# Patient Record
Sex: FEMALE | Race: WHITE | URBAN - METROPOLITAN AREA
[De-identification: names, ages, dates, MRNs, and addresses within clinical notes are randomized per-mention and may not be internally consistent; named-entity substitution may affect disease eponyms.]

---

## 2020-06-29 ENCOUNTER — TRANSFERRED RECORDS (OUTPATIENT)
Dept: HEALTH INFORMATION MANAGEMENT | Facility: CLINIC | Age: 35
End: 2020-06-29

## 2020-07-01 ENCOUNTER — TRANSCRIBE ORDERS (OUTPATIENT)
Dept: OTHER | Age: 35
End: 2020-07-01

## 2020-07-01 ENCOUNTER — MEDICAL CORRESPONDENCE (OUTPATIENT)
Dept: HEALTH INFORMATION MANAGEMENT | Facility: CLINIC | Age: 35
End: 2020-07-01

## 2020-07-01 DIAGNOSIS — H04.552 NASOLACRIMAL DUCT OBSTRUCTION, ACQUIRED, LEFT: Primary | ICD-10-CM

## 2020-07-02 ENCOUNTER — TELEPHONE (OUTPATIENT)
Dept: OPHTHALMOLOGY | Facility: CLINIC | Age: 35
End: 2020-07-02

## 2020-07-06 NOTE — TELEPHONE ENCOUNTER
FUTURE VISIT INFORMATION      FUTURE VISIT INFORMATION:    Date: 7/21/20    Time: 9:30am    [place] [facility] [place]:    Referring provider:  Lisandra Gupta  Referring providers clinic:  [facility]     Reason for visit/diagnosis  Chronic Nasolacrimal duct obstruction - would like repeat DCR,left eye/scope irrigation    RECORDS REQUESTED FROM:       Clinic name Comments Records Status Imaging Status   [facility]    recs scanned into chart EPIC

## 2020-08-21 ENCOUNTER — PRE VISIT (OUTPATIENT)
Dept: OPHTHALMOLOGY | Facility: CLINIC | Age: 35
End: 2020-08-21

## 2020-08-21 ENCOUNTER — OFFICE VISIT (OUTPATIENT)
Dept: OPHTHALMOLOGY | Facility: CLINIC | Age: 35
End: 2020-08-21
Payer: COMMERCIAL

## 2020-08-21 ENCOUNTER — TELEPHONE (OUTPATIENT)
Dept: OPHTHALMOLOGY | Facility: CLINIC | Age: 35
End: 2020-08-21

## 2020-08-21 DIAGNOSIS — Z11.59 ENCOUNTER FOR SCREENING FOR OTHER VIRAL DISEASES: Primary | ICD-10-CM

## 2020-08-21 DIAGNOSIS — H04.202 LEFT EPIPHORA: ICD-10-CM

## 2020-08-21 DIAGNOSIS — H04.202 LEFT EPIPHORA: Primary | ICD-10-CM

## 2020-08-21 RX ORDER — ONDANSETRON 4 MG/1
4 TABLET, FILM COATED ORAL
COMMUNITY
Start: 2018-11-28

## 2020-08-21 RX ORDER — BUTORPHANOL TARTRATE 10 MG/ML
SPRAY NASAL
COMMUNITY
Start: 2019-02-12

## 2020-08-21 RX ORDER — KETOROLAC TROMETHAMINE 30 MG/ML
INJECTION, SOLUTION INTRAMUSCULAR; INTRAVENOUS
COMMUNITY
Start: 2019-05-23

## 2020-08-21 ASSESSMENT — VISUAL ACUITY
OS_SC: 20/20
OD_SC+: -1
METHOD: SNELLEN - LINEAR
OS_SC+: -
OD_SC: 20/20

## 2020-08-21 ASSESSMENT — SLIT LAMP EXAM - LIDS: COMMENTS: NORMAL

## 2020-08-21 ASSESSMENT — CONF VISUAL FIELD
METHOD: COUNTING FINGERS
OS_NORMAL: 1
OD_NORMAL: 1

## 2020-08-21 ASSESSMENT — TONOMETRY
IOP_METHOD: ICARE
OD_IOP_MMHG: 11
OS_IOP_MMHG: 12

## 2020-08-21 NOTE — LETTER
2020         RE:  :  MRN: Cathryn Mahajan  1985  [id number]     Dear Dr. Lisandra Gupta,    Thank you for asking me to see your patient, Cathryn Mahajan, for an oculoplastic   consultation.  My assessment and plan are below.  For further details, please see my attached clinic note.      Assessment & Plan     Cathryn Mahajan is a 34 year old female with the following diagnoses:   1. Left epiphora - Left Eye          S/p Dacryocystorhinostomy (DCR) with continuous tearing. Likely multi-factorial.     PLAN:  Left conjunctivodacryocystorhinostomy with Mansfield tube           Again, thank you for allowing me to participate in the care of your patient.      Sincerely,    Carlos Mcgraw MD  Department of Ophthalmology and Visual Neurosciences  [facility]    CC: Lisandra Gupta  [facility]  [address]. \Hospital Has a New Name and Outlook.\"" [id number]  VIA Outside Provider Messaging

## 2020-08-21 NOTE — TELEPHONE ENCOUNTER
Met with patient to schedule surgery with Dr. Carlos Mcgraw.    Surgery was scheduled on 09/09 at [facility]  Patient will have H&P at [facility]     Patient is aware a COVID-19 test is needed before their procedure. The test should be with-in 4 days of their procedure.   Test Details: Patient will call [facility] to schedule test     Post-Op visit was scheduled on 09/18  Patient is aware a / is needed day of surgery.   Surgery packet was given, patient has my direct contact information for any further questions.

## 2020-08-21 NOTE — PROGRESS NOTES
Chief Complaints and History of Present Illnesses   Patient presents with     Consult For     Chief Complaint(s) and History of Present Illness(es)     Consult For     In left eye.  Onset was gradual.  This started years ago.  Charactertized   as  blurred vision.  Occurring constantly.  It is worse throughout the   day.  Since onset it is gradually worsening.  Associated symptoms include   tearing.  Treatments tried include artificial tears.  Pain was noted as   0/10.              Comments     Referred by [facility] for NLDO eval OS. Has had surgery with stint in the   past but has since fallen out. Tearing for 3-4 years since last surgery.   Eye is irritated but not painful, causes blurred vision. Using OTC drops    Evelin Von COT 9:27 AM August 21, 2020       Kerri Enriquez COT 9:08 AM August 21, 2020     History left external DCR w/ silicone intubation approximately 9 years ago   after facial trauma.  Stents stayed in for several months, but has had   tearing ever since the surgery.  Works as  and works around   many irritants that cause tearing.  Tears run down left cheek everyday.    Right side no issues.         Assessment & Plan     Cathryn Mahajan is a 34 year old female with the following diagnoses:   1. Left epiphora - Left Eye    2. Left epiphora       S/p Dacryocystorhinostomy (DCR) with continuous tearing. Likely multi-factorial.     PLAN:  Left conjunctivodacryocystorhinostomy with Mansfield tube             Attending Physician Attestation:  Complete documentation of historical and exam elements from today's encounter can be found in the full encounter summary report (not reduplicated in this progress note).  I personally obtained the chief complaint(s) and history of present illness.  I confirmed and edited as necessary the review of systems, past medical/surgical history, family history, social history, and examination findings as documented by others; and I examined the patient myself.  I  personally reviewed the relevant tests, images, and reports as documented above.  I formulated and edited as necessary the assessment and plan and discussed the findings and management plan with the patient and family. I personally reviewed the ophthalmic test(s) associated with this encounter, agree with the interpretation(s) as documented by the resident/fellow, and have edited the corresponding report(s) as necessary.   -Carlos Mcgraw MD  10:18 AM 8/21/2020    Today with Cathryn Mahajan, I reviewed the indications, risks, benefits, and alternatives of the proposed surgical procedure including, but not limited to, failure obtain the desired result  and need for additional surgery, bleeding, infection, loss of vision, loss of the eye, and the remote possibility of permanent damage to any organ system or death with the use of anesthesia.  I provided multiple opportunities for the questions, answered all questions to the best of my ability, and confirmed that my answers and my discussion were understood.     - Carlos Mcgraw MD 10:19 AM 8/21/2020

## 2020-08-21 NOTE — NURSING NOTE
Chief Complaints and History of Present Illnesses   Patient presents with     Consult For     Chief Complaint(s) and History of Present Illness(es)     Consult For     Laterality: left eye    Onset: gradual    Onset: years ago    Quality: blurred vision    Frequency: constantly    Timing: throughout the day    Course: gradually worsening    Associated symptoms: tearing    Treatments tried: artificial tears    Pain scale: 0/10              Comments     Referred by [facility] for NLDO eval OS. Has had surgery with stint in the past but has since fallen out. Tearing for 3-4 years since last surgery. Eye is irritated but not painful, causes blurred vision. Using OTC drops    Evelin Longoria COT 9:27 AM August 21, 2020       Kerri Enriquez COT 9:08 AM August 21, 2020

## 2020-08-21 NOTE — PROGRESS NOTES
Chief Complaints and History of Present Illnesses   Patient presents with     Consult For     Chief Complaint(s) and History of Present Illness(es)     Consult For     In left eye.  Onset was gradual.  This started years ago.  Charactertized   as  blurred vision.  Occurring constantly.  It is worse throughout the   day.  Since onset it is gradually worsening.  Associated symptoms include   tearing.  Treatments tried include artificial tears.  Pain was noted as   0/10.              Comments     Referred by [facility] for NLDO eval OS. Has had surgery with stint in the   past but has since fallen out. Tearing for 3-4 years since last surgery.   Eye is irritated but not painful, causes blurred vision. Using OTC drops    Evelin Longoria COT 9:27 AM August 21, 2020       Kerri Enriquez COT 9:08 AM August 21, 2020     History left external DCR w/ silicone intubation approximately 9 years ago   after facial trauma.  Stents stayed in for several months, but has had   tearing ever since the surgery.  Works as  and works around   many irritants that cause tearing.  Tears run down left cheek everyday.    Right side no issues.                     Assessment & Plan     Cathryn Mahajan is a 34 year old female with the following diagnoses:   1. Left epiphora - Left Eye       Patient being seen at request of or referred by Dr. Gupta.  History of left external DCR ~9 years ago with persistent epiphora left eye since surgery.  Occurs daily, no help from tears/OTC allergy drops.  Puncta patent and easily open to irrigation with slightly enlarged lower punctum.  Nasal mucosal ostium is patent and unobstructed.      Plan:  Consider punctoplasty vs Mansfield tube vs Botox to lacrimal gland.                Jarocho Velásquez MD, MPH  Oculoplastics Fellow

## 2020-08-21 NOTE — PATIENT INSTRUCTIONS
TEAR SYSTEM  The tear film on the surface of the eye is a critical component of maintaining vision. Tears nourish and lubricate the surface of the eye as well as wash away debris. A smooth, balanced tear film (consisting of water, oil and mucus) also allows light to enter the eye in an optimal fashion. If there is a disturbance of the tear film, patients will often experience tearing, burning, irritation and most importantly blurred vision. Patients who experience tearing either have a problem with tear production or tear drainage.    Increased Tear Production and Dry Eyes  The eye has two sets of structures that produce tears. Smaller tear glands help maintain a baseline level of moisture on the surface of the eye. Unfortunately, inflammatory conditions like rheumatoid arthritis, Sjogrens disease as well as aging and menopause lead to decreased tear production. As tear production diminishes, the surface of the eye starts to dry out. Further, inflammation of the oil glands along the edge of the eyelid, common in patients with roseacea, also causes early breakdown and evaporation of the tear film. The brain senses the eye is both dry and irritated and in turn signals the main tear gland to flush the eye. As a result, the dry eye paradoxically tears and becomes watery. Patients with dry eyes note intermittent tearing of the eyes during activities like reading, driving, watching TV, using a computer or going outside on a windy day. These all cause the eye to dry out because the eye blinks less during these activities. The treatment for dry eyes includes 1) replacing tears with artificial lubricants which can be bought over the counter, 2) medications like Restasis that decrease inflammation in tear glands and encourages natural tear production to resume and finally 3) plugging of the tear drain. Other causes of increased tear production exist like allergies, infections and eyelashes poking the eye. These conditions can  often be found during examination.       What Are the Causes of Obstructed Tear Ducts?  An obstruction of the tear ducts may occur due to numerous reasons (aging, trauma, inflammatory conditions, medications and tumors) and cause numerous signs and symptoms ranging from wateriness or tearing to discharge, swelling, pain and infection. These signs and symptoms may result from the tear drainage system becoming obstructed at any point from the puncta to the nasal cavity.       What Are the Symptoms of Obstructed Tear Ducts?  If the tear passageways become blocked, tears cannot drain properly and may overflow from the eyelids onto the face as if you were crying. In addition to excessive tearing you may also experience blurred vision, mucous discharge, eye irritation, and painful swelling in the inner corner of the eyelids. A thorough examination by an ophthalmic plastic surgeon can determine the cause of tearing and recommended treatment.     How is an Obstructed Tear Duct Treated or Repaired?   Depending on your symptoms and their severity, your specialist will suggest an appropriate course. In mild cases, a treatment of warm compresses and antibiotics may be recommended. In more severe cases, surgical intervention to bypass the tear duct obstruction (dacryocystorhinostomy or DCR surgery) may be recommended. A DCR is performed by creating a new tear passageway from the lacrimal sac to the nose, bypassing the obstruction. This procedure may be perfomed through a small incision between the eye and nose (external DCR) or through the nose using a special lighted telescope (endoscopic DCR). A small silicone tube called a stent may temporarily be placed in the new passageway to keep it open during the healing process. In a small percentage of cases, the obstruction is between the puncta and the lacrimal sac. In these cases, in addition to the DCR procedure, the surgeon will insert a tiny artificial tear drain called a Mansfield  Tube. A Mansfield Tube is made of Pyrex glass and allows tears to drain directly from the eye to the lacrimal sac.       Where Is The Surgery Performed?  DCR surgery is usually performed as an outpatient procedure. Patients usually have some bruising and swelling on the side of the nose that subsides in one to two weeks. In general, surgery has a greater than 90% success rate and most patients experience a resolution of their tearing and discharge problems once surgery and recovery are completed.       Who Should Perform DCR Surgery?  When choosing a surgeon to perform a dacryocystorhinostomy or DCR, look for an ophthalmic plastic reconstructive and cosmetic surgeon who specializes in the eyelids, orbit, and tear drain system. It s also important that he or she is a member of the American Society of Ophthalmic Plastic and Reconstructive Surgery (ASOPRS). Dr. Dawson's membership in the American Society of Ophthalmic Plastic and Reconstructive Surgery (ASOPRS) indicates he is not only a board certified ophthalmologist who knows the anatomy and structure of the eyelids and orbit, but also has had extensive training in ophthalmic plastic reconstructive and cosmetic surgery.

## 2020-09-03 ENCOUNTER — ANESTHESIA EVENT (OUTPATIENT)
Dept: SURGERY | Facility: AMBULATORY SURGERY CENTER | Age: 35
End: 2020-09-03

## 2020-09-04 ENCOUNTER — ANESTHESIA (OUTPATIENT)
Dept: SURGERY | Facility: AMBULATORY SURGERY CENTER | Age: 35
End: 2020-09-04

## 2020-09-04 ENCOUNTER — HOSPITAL ENCOUNTER (OUTPATIENT)
Facility: AMBULATORY SURGERY CENTER | Age: 35
End: 2020-09-04
Attending: OPHTHALMOLOGY
Payer: COMMERCIAL

## 2020-09-04 VITALS
DIASTOLIC BLOOD PRESSURE: 92 MMHG | WEIGHT: 201 LBS | HEART RATE: 70 BPM | OXYGEN SATURATION: 97 % | HEIGHT: 68 IN | SYSTOLIC BLOOD PRESSURE: 142 MMHG | BODY MASS INDEX: 30.46 KG/M2 | TEMPERATURE: 97.2 F | RESPIRATION RATE: 20 BRPM

## 2020-09-04 DIAGNOSIS — R11.0 NAUSEA: Primary | ICD-10-CM

## 2020-09-04 DIAGNOSIS — H04.202 LEFT EPIPHORA: ICD-10-CM

## 2020-09-04 DEVICE — IMPLANTABLE DEVICE: Type: IMPLANTABLE DEVICE | Site: EYE | Status: FUNCTIONAL

## 2020-09-04 RX ORDER — ACETAMINOPHEN 325 MG/1
975 TABLET ORAL ONCE
Status: COMPLETED | OUTPATIENT
Start: 2020-09-04 | End: 2020-09-04

## 2020-09-04 RX ORDER — COCAINE HYDROCHLORIDE 40 MG/ML
SOLUTION NASAL PRN
Status: DISCONTINUED | OUTPATIENT
Start: 2020-09-04 | End: 2020-09-04 | Stop reason: HOSPADM

## 2020-09-04 RX ORDER — DEXAMETHASONE SODIUM PHOSPHATE 4 MG/ML
INJECTION, SOLUTION INTRA-ARTICULAR; INTRALESIONAL; INTRAMUSCULAR; INTRAVENOUS; SOFT TISSUE PRN
Status: DISCONTINUED | OUTPATIENT
Start: 2020-09-04 | End: 2020-09-04

## 2020-09-04 RX ORDER — ONDANSETRON 2 MG/ML
INJECTION INTRAMUSCULAR; INTRAVENOUS PRN
Status: DISCONTINUED | OUTPATIENT
Start: 2020-09-04 | End: 2020-09-04

## 2020-09-04 RX ORDER — FENTANYL CITRATE 50 UG/ML
25-50 INJECTION, SOLUTION INTRAMUSCULAR; INTRAVENOUS
Status: DISCONTINUED | OUTPATIENT
Start: 2020-09-04 | End: 2020-09-05 | Stop reason: HOSPADM

## 2020-09-04 RX ORDER — PROPOFOL 10 MG/ML
INJECTION, EMULSION INTRAVENOUS CONTINUOUS PRN
Status: DISCONTINUED | OUTPATIENT
Start: 2020-09-04 | End: 2020-09-04

## 2020-09-04 RX ORDER — MEPERIDINE HYDROCHLORIDE 25 MG/ML
12.5 INJECTION INTRAMUSCULAR; INTRAVENOUS; SUBCUTANEOUS
Status: DISCONTINUED | OUTPATIENT
Start: 2020-09-04 | End: 2020-09-05 | Stop reason: HOSPADM

## 2020-09-04 RX ORDER — ONDANSETRON 4 MG/1
4 TABLET, ORALLY DISINTEGRATING ORAL EVERY 30 MIN PRN
Status: DISCONTINUED | OUTPATIENT
Start: 2020-09-04 | End: 2020-09-05 | Stop reason: HOSPADM

## 2020-09-04 RX ORDER — NEOMYCIN POLYMYXIN B SULFATES AND DEXAMETHASONE 3.5; 10000; 1 MG/ML; [USP'U]/ML; MG/ML
1 SUSPENSION/ DROPS OPHTHALMIC 4 TIMES DAILY
Qty: 5 ML | Refills: 0 | Status: SHIPPED | OUTPATIENT
Start: 2020-09-04

## 2020-09-04 RX ORDER — ONDANSETRON 4 MG/1
4 TABLET, FILM COATED ORAL EVERY 8 HOURS PRN
Qty: 10 TABLET | Refills: 0 | Status: SHIPPED | OUTPATIENT
Start: 2020-09-04

## 2020-09-04 RX ORDER — OXYCODONE HCL 5 MG/5 ML
5 SOLUTION, ORAL ORAL EVERY 4 HOURS PRN
Status: DISCONTINUED | OUTPATIENT
Start: 2020-09-04 | End: 2020-09-05 | Stop reason: HOSPADM

## 2020-09-04 RX ORDER — LIDOCAINE HYDROCHLORIDE AND EPINEPHRINE 10; 10 MG/ML; UG/ML
INJECTION, SOLUTION INFILTRATION; PERINEURAL PRN
Status: DISCONTINUED | OUTPATIENT
Start: 2020-09-04 | End: 2020-09-04 | Stop reason: HOSPADM

## 2020-09-04 RX ORDER — SODIUM CHLORIDE, SODIUM LACTATE, POTASSIUM CHLORIDE, CALCIUM CHLORIDE 600; 310; 30; 20 MG/100ML; MG/100ML; MG/100ML; MG/100ML
INJECTION, SOLUTION INTRAVENOUS CONTINUOUS PRN
Status: DISCONTINUED | OUTPATIENT
Start: 2020-09-04 | End: 2020-09-04

## 2020-09-04 RX ORDER — ERYTHROMYCIN 5 MG/G
OINTMENT OPHTHALMIC PRN
Status: DISCONTINUED | OUTPATIENT
Start: 2020-09-04 | End: 2020-09-04 | Stop reason: HOSPADM

## 2020-09-04 RX ORDER — LIDOCAINE 40 MG/G
CREAM TOPICAL
Status: DISCONTINUED | OUTPATIENT
Start: 2020-09-04 | End: 2020-09-04 | Stop reason: HOSPADM

## 2020-09-04 RX ORDER — HYDROCODONE BITARTRATE AND ACETAMINOPHEN 5; 325 MG/1; MG/1
1 TABLET ORAL EVERY 6 HOURS PRN
Qty: 10 TABLET | Refills: 0 | Status: SHIPPED | OUTPATIENT
Start: 2020-09-04 | End: 2020-09-09

## 2020-09-04 RX ORDER — NALOXONE HYDROCHLORIDE 0.4 MG/ML
.1-.4 INJECTION, SOLUTION INTRAMUSCULAR; INTRAVENOUS; SUBCUTANEOUS
Status: DISCONTINUED | OUTPATIENT
Start: 2020-09-04 | End: 2020-09-05 | Stop reason: HOSPADM

## 2020-09-04 RX ORDER — PROPOFOL 10 MG/ML
INJECTION, EMULSION INTRAVENOUS PRN
Status: DISCONTINUED | OUTPATIENT
Start: 2020-09-04 | End: 2020-09-04

## 2020-09-04 RX ORDER — FENTANYL CITRATE 50 UG/ML
INJECTION, SOLUTION INTRAMUSCULAR; INTRAVENOUS PRN
Status: DISCONTINUED | OUTPATIENT
Start: 2020-09-04 | End: 2020-09-04

## 2020-09-04 RX ORDER — AZITHROMYCIN 250 MG/1
TABLET, FILM COATED ORAL
Qty: 6 TABLET | Refills: 0 | Status: SHIPPED | OUTPATIENT
Start: 2020-09-04 | End: 2020-09-09

## 2020-09-04 RX ORDER — LIDOCAINE HYDROCHLORIDE 20 MG/ML
INJECTION, SOLUTION INFILTRATION; PERINEURAL PRN
Status: DISCONTINUED | OUTPATIENT
Start: 2020-09-04 | End: 2020-09-04

## 2020-09-04 RX ORDER — SODIUM CHLORIDE, SODIUM LACTATE, POTASSIUM CHLORIDE, CALCIUM CHLORIDE 600; 310; 30; 20 MG/100ML; MG/100ML; MG/100ML; MG/100ML
INJECTION, SOLUTION INTRAVENOUS CONTINUOUS
Status: DISCONTINUED | OUTPATIENT
Start: 2020-09-04 | End: 2020-09-04 | Stop reason: HOSPADM

## 2020-09-04 RX ORDER — ONDANSETRON 2 MG/ML
4 INJECTION INTRAMUSCULAR; INTRAVENOUS EVERY 30 MIN PRN
Status: DISCONTINUED | OUTPATIENT
Start: 2020-09-04 | End: 2020-09-05 | Stop reason: HOSPADM

## 2020-09-04 RX ADMIN — ONDANSETRON 4 MG: 2 INJECTION INTRAMUSCULAR; INTRAVENOUS at 12:54

## 2020-09-04 RX ADMIN — DEXAMETHASONE SODIUM PHOSPHATE 4 MG: 4 INJECTION, SOLUTION INTRA-ARTICULAR; INTRALESIONAL; INTRAMUSCULAR; INTRAVENOUS; SOFT TISSUE at 12:03

## 2020-09-04 RX ADMIN — Medication 5 MG: at 13:07

## 2020-09-04 RX ADMIN — SODIUM CHLORIDE, SODIUM LACTATE, POTASSIUM CHLORIDE, CALCIUM CHLORIDE: 600; 310; 30; 20 INJECTION, SOLUTION INTRAVENOUS at 11:58

## 2020-09-04 RX ADMIN — FENTANYL CITRATE 50 MCG: 50 INJECTION, SOLUTION INTRAMUSCULAR; INTRAVENOUS at 12:18

## 2020-09-04 RX ADMIN — PROPOFOL 200 MG: 10 INJECTION, EMULSION INTRAVENOUS at 12:03

## 2020-09-04 RX ADMIN — LIDOCAINE HYDROCHLORIDE 100 MG: 20 INJECTION, SOLUTION INFILTRATION; PERINEURAL at 12:03

## 2020-09-04 RX ADMIN — PROPOFOL 150 MCG/KG/MIN: 10 INJECTION, EMULSION INTRAVENOUS at 12:03

## 2020-09-04 RX ADMIN — ONDANSETRON 4 MG: 2 INJECTION INTRAMUSCULAR; INTRAVENOUS at 12:03

## 2020-09-04 RX ADMIN — ACETAMINOPHEN 975 MG: 325 TABLET ORAL at 10:30

## 2020-09-04 SDOH — HEALTH STABILITY: MENTAL HEALTH: HOW OFTEN DO YOU HAVE A DRINK CONTAINING ALCOHOL?: NEVER

## 2020-09-04 ASSESSMENT — MIFFLIN-ST. JEOR: SCORE: 1650.23

## 2020-09-04 NOTE — OR NURSING
Discharge instructions reviewed with patient's spouse via phone, no questions or concerns at this time.

## 2020-09-04 NOTE — DISCHARGE INSTRUCTIONS
Post-operative Instructions    Ophthalmic Plastic and Reconstructive Surgery  Carlos Mcgraw M.D.      All instructions apply to the operated eye(s) or eyelid(s)    What to expect after surgery:    There will be some swelling, bruising, and likely a black eye (even into the lower eyelids and cheeks). Also expect crusting and discharge from the eye and/or incisions.     A small amount of surface bleeding is normal for the first 48 hours after surgery.    You may notice some bloody tears for the first few days after surgery. This is normal.    Your eye(s) and eyelid(s) may be painful and tender. This is normal after surgery. Use the pain medication as prescribed. If your pain does not improve despite the medication, contact the office.    Wound care and personal care:    If a patch or bandage has been placed, please leave this in place until seen in clinic. Prevent the bandage from getting wet.     Apply ice compresses 15 minutes on 15 minutes off while awake for the first 2 days after surgery, then switch to warm compresses 4 times a day until seen by your physician.     For warm packs you can place a cup of dry uncooked rice in a clean cotton sock. Place sock in microwave 30 seconds to one minute. Next place the warm sock into a plastic bag and wrap the bag with clean warm wet washcloth and place over operated eye.      You may shower or wash your hair the day after surgery. Do not bathe or go swimming for 1 week to prevent contamination of your wounds.    Do not apply make-up to the eyes or eyelids for 2 weeks after surgery.    Do not blow your nose or drink hot liquids for 1 week after your surgery.    Do not pull at the small tube in the nose or corner of the eye.    Activity restrictions and driving:    Avoid heavy lifting, bending, exercise or strenuous activity for 1 week after surgery.    You may resume other activities and return to work as tolerated.    You may not resume driving until have you stopped  using narcotic pain medications(such as Norco, Percocet, Tylenol #3).    Sinus Precautions for 1 week: Sneeze with mouth open. Cough with mouth open. No blowing nose. No straws. No bending over.    Medications:    Restart all your regular home medications and eye drops today. If you take Plavix or Aspirin on a regular basis, wait for 3 days after your surgery before restarting these in order to decrease the risk of bleeding complications.    Avoid aspirin and aspirin-like medications (Motrin, Aleve, Ibuprofen, Ronda-Charlotte etc) for 5 days to reduce the risk of bleeding. You may take Tylenol (acetaminophen) for pain.    In addition to your home medications, take the following post-operative medications as prescribed by your physician:    Instill eye drops (Maxitrol) four times a day until the bottle finished.     Antibiotic - take as directed     Take 1 to 2 pain pills (norco or oxycodone as prescribed) as needed for pain up to every 4 hours.    The pain pills may make you drowsy. You must not drive a car, operate heavy machinery or drink alcohol while taking them.    The pain pills may cause constipation and nausea. Take them with some food to prevent a stomach upset. If you continue to experience nausea, call your physician.      WARNING: All the prescription pain medications listed above contain Tylenol (acetaminophen). You must not take more than 4,000 mg of acetaminophen per 24-hour period. This is equivalent to 6 tablets of Darvocet, 8 tablets of Vicodin, or 12 tablets of Norco, Percocet or Tylenol #3. If you take other over-the-counter medications containing acetaminophen, you must take the amount of acetaminophen into account and reduce the number of prescribed pain pills accordingly.    Contact information and follow-up:    Return to the Eye Clinic for a follow-up appointment with your physician as  scheduled. If no appointment has been scheduled, call [phone] for an  appointment with Dr. Mcgraw  within 1 to 2 weeks from your date of surgery.    Please email a few photos of your eye(s) or other operative site(s) to [email or web address] prior to your follow up visit.      For severe pain, bleeding, or loss of vision, call the Eye Clinic at [phone].    After hours or on weekends and holidays, call [phone] and ask to speak with the ophthalmologist on call.    [facility]  Home Care Following Anesthesia  For 24 hours after surgery:  1. Get plenty of rest.  A responsible adult must stay with you for at least 24 hours after you leave the surgery center.  2. Do not drive or use heavy equipment.  If you have weakness or tingling, don't drive or use heavy equipment until this feeling goes away.   3. Do not drink alcohol.   4. Avoid strenuous or risky activities.  Ask for help when climbing stairs.  5. You may feel lightheaded.  IF so, sit for a few minutes before standing.  Have someone help you get up.   6. If you have nausea (feel sick to your stomach): Drink only clear liquids such as apple juice, ginger ale, broth or 7-Up.  Rest may also help.  Be sure to drink enough fluids.  Move to a regular diet as you feel able.   7. You may have a slight fever.  Call the doctor if your fever is over 100 F (37.7 C) (taken under the tongue) or lasts longer than 24 hours.  8. You may have a dry mouth, a sore throat, muscle aches or trouble sleeping. These should go away after 24 hours.  9. Do not make important or legal decisions.        If you use hormonal birth control (such as the pill, patch, ring or implants):  You will need a second form of birth control for 7 days (condoms, a diaphragm or contraceptive foam).  While in the surgery center, you received a medicine called Sugammadex.  Hormonal birth control (such as the pill, patch, ring or implants) will not work as well for a week after taking this medicine.         Tips for taking pain medications  To get the best pain  relief possible, remember these points:    Take pain medications as directed, before pain becomes severe.    Pain medication can upset your stomach: taking it with food may help.    Constipation is a common side effect of pain medication. Drink plenty of  fluids.    Eat foods high in fiber. Take a stool softener if recommended by your doctor or pharmacist.    Do not drink alcohol, drive or operate machinery while taking pain medications.    Ask about other ways to control pain, such as with heat, ice or relaxation.    Tylenol/Acetaminophen Consumption  To help encourage the safe use of acetaminophen, the makers of TYLENOL  have lowered the maximum daily dose for single-ingredient Extra Strength TYLENOL  (acetaminophen) products sold in the [place] from 8 pills per day (4,000 mg) to 6 pills per day (3,000 mg). The dosing interval has also changed from 2 pills every 4-6 hours to 2 pills every 6 hours.    If you feel your pain relief is insufficient, you may take Tylenol/Acetaminophen in addition to your narcotic pain medication.     Be careful not to exceed 3,000 mg of Tylenol/Acetaminophen in a 24 hour period from all sources.    If you are taking extra strength Tylenol/acetaminophen (500 mg), the maximum dose is 6 tablets in 24 hours.    If you are taking regular strength acetaminophen (325 mg), the maximum dose is 9 tablets in 24 hours.    Call a doctor for any of the followin. Signs of infection (fever, growing tenderness at the surgery site, a large amount of drainage or bleeding, severe pain, foul-smelling drainage, redness, swelling).  2. It has been over 8 to 10 hours since surgery and you are still not able to urinate (pass water).  3. Headache for over 24 hours.  4. Numbness, tingling or weakness the day after surgery (if you had spinal anesthesia).  5. Signs of Covid-19 infection (temperature over 100 degrees, shortness of breath, cough, loss of taste/smell, generalized body aches, persistent headache,  chills, sore throat, nausea/vomiting/diarrhea)  Your doctor is:  Dr. Carlos Mcgraw, Ophthalmology: [phone]                    Or dial [phone] and ask for the resident on call for:  Ophthalmology  For emergency care, call the:  [facility]:  [phone] (TTY for hearing impaired: [phone])

## 2020-09-04 NOTE — BRIEF OP NOTE
[facility] Brief Operative Note    Pre-operative diagnosis: Left epiphora [H04.202]   Post-operative diagnosis Same   Procedure: Procedure(s):  Left endoscopic conjunctivodacryocystorhinostomy with stop loss clark tube   Surgeon(s): Surgeon(s) and Role:     * Carlos Mcgraw MD - Primary     * Jarocho Velásquez MD - Fellow - Assisting   Estimated blood loss: 5cc   Specimens: * No specimens in log *   Findings: As expected

## 2020-09-04 NOTE — ANESTHESIA CARE TRANSFER NOTE
Patient: Cathryn Mahajan    Procedure(s):  Left endoscopic conjunctivodacryocystorhinostomy with stop loss clark tube    Diagnosis: Left epiphora [H04.202]  Diagnosis Additional Information: No value filed.    Anesthesia Type:   General     Note:  Airway :Face Mask  Patient transferred to:PACU  Handoff Report: Identifed the Patient, Identified the Reponsible Provider, Reviewed the pertinent medical history, Discussed the surgical course, Reviewed Intra-OP anesthesia mangement and issues during anesthesia, Set expectations for post-procedure period and Allowed opportunity for questions and acknowledgement of understanding      Vitals: (Last set prior to Anesthesia Care Transfer)    CRNA VITALS  9/4/2020 1209 - 9/4/2020 1245      9/4/2020             Pulse:  89    SpO2:  96 %    Resp Rate (observed):  8                Electronically Signed By: RHONDA Tyler CRNA  September 4, 2020  12:45 PM

## 2020-09-04 NOTE — ANESTHESIA PREPROCEDURE EVALUATION
"Anesthesia Pre-Procedure Evaluation    Patient: Cathryn Mahajan   MRN:     [id number] Gender:   female   Age:    34 year old :      1985        Preoperative Diagnosis: Left epiphora [H04.202]   Procedure(s):  Left endoscopic conjunctivodacryocystorhinostomy with stop loss clark tube     LABS:  CBC: No results found for: WBC, HGB, HCT, PLT  BMP: No results found for: NA, POTASSIUM, CHLORIDE, CO2, BUN, CR, GLC  COAGS: No results found for: PTT, INR, FIBR  POC: No results found for: BGM, HCG, HCGS  OTHER: No results found for: PH, LACT, A1C, EBONIE, PHOS, MAG, ALBUMIN, PROTTOTAL, ALT, AST, GGT, ALKPHOS, BILITOTAL, BILIDIRECT, LIPASE, AMYLASE, JESSICA, TSH, T4, T3, CRP, SED     Preop Vitals    BP Readings from Last 3 Encounters:   20 (!) 143/100    Pulse Readings from Last 3 Encounters:   20 70      Resp Readings from Last 3 Encounters:   20 16    SpO2 Readings from Last 3 Encounters:   20 100%      Temp Readings from Last 1 Encounters:   20 36.7  C (98  F)    Ht Readings from Last 1 Encounters:   20 1.727 m (5' 8\")      Wt Readings from Last 1 Encounters:   20 91.2 kg (201 lb)    Estimated body mass index is 30.56 kg/m  as calculated from the following:    Height as of this encounter: 1.727 m (5' 8\").    Weight as of this encounter: 91.2 kg (201 lb).     LDA:  Peripheral IV 20 Right Upper forearm (Active)   Site Assessment WDL 20 1031   Line Status Infusing 20 1031   Phlebitis Scale 0-->no symptoms 20 1031   Infiltration Scale 0 20 1031   Infiltration Site Treatment Method  None 20 1031   Extravasation? No 20 1031   Dressing Intervention New dressing  20 1031   Number of days: 0        History reviewed. No pertinent past medical history.   Past Surgical History:   Procedure Laterality Date     DACRYOCYSTORHINOSTOMY Left       Allergies   Allergen Reactions     Amoxicillin Hives and Shortness Of Breath     Chicken-Derived " Products (Egg) Hives     Patient states if she eats too much of them, then she getst the reaction.   Patient states if she eats too much of them, then she getst the reaction.        Flu Virus Vaccine Hives     Haemophilus Influenzae Hives     Influenza Vac Typ Hives     She still gets the injection every year     Sulfa Drugs Hives     Pollen Extract Other (See Comments) and Rash        Anesthesia Evaluation     . Pt has had prior anesthetic. Type: General           ROS/MED HX    ENT/Pulmonary: Comment: S/p dacrocystorhinostomy      Neurologic:     (+)migraines,     Cardiovascular:  - neg cardiovascular ROS       METS/Exercise Tolerance:  >4 METS   Hematologic:  - neg hematologic  ROS       Musculoskeletal:  - neg musculoskeletal ROS       GI/Hepatic:  - neg GI/hepatic ROS       Renal/Genitourinary:  - ROS Renal section negative       Endo:  - neg endo ROS       Psychiatric:         Infectious Disease:  - neg infectious disease ROS       Malignancy:      - no malignancy   Other:    - neg other ROS                     PHYSICAL EXAM:   Mental Status/Neuro: A/A/O   Airway: Facies: Feasible  Mallampati: I  Mouth/Opening: Full  TM distance: > 6 cm  Neck ROM: Full   Respiratory: Auscultation: CTAB     Resp. Rate: Normal     Resp. Effort: Normal      CV: Rhythm: Regular  Rate: Age appropriate  Heart: Normal Sounds  Edema: None   Comments:      Dental: Normal Dentition                Assessment:   ASA SCORE: 2    H&P: History and physical reviewed and following examination; no interval change.   Smoking Status:  Non-Smoker/Unknown   NPO Status: NPO Appropriate     Plan:   Anes. Type:  General   Pre-Medication: Acetaminophen   Induction:  IV (Standard)   Airway: LMA   Access/Monitoring: PIV   Maintenance: Balanced     Postop Plan:   Postop Pain: Opioids  Postop Sedation/Airway: Not planned  Disposition: Outpatient     PONV Management:   Adult Risk Factors: Female, Non-Smoker, Postop Opioids   Prevention: Ondansetron,  Dexamethasone     CONSENT: Direct conversation   Plan and risks discussed with: Patient                      Keyanna Cosme MD

## 2020-09-04 NOTE — OP NOTE
PREOPERATIVE DIAGNOSIS:  Left  complete nasolacrimal duct obstruction.      POSTOPERATIVE DIAGNOSIS:  Left complete nasolacrimal duct obstruction.      PROCEDURE PERFORMED:  Left conjunctivodacryocystorhinostomy with placement of Stop Loss Mansfield tube ( left 4 x 15 mm)     ANESTHESIA:  General, with local infiltration of 1% lidocaine with epinephrine [id number] .     SURGEON:  Romel Mcgraw MD     ASSISTANT:  Jarocho Velásquez MD     COMPLICATIONS:  None.      ESTIMATED BLOOD LOSS:  Less than 5 cc.      HISTORY AND INDICATIONS:  Cathryn Mahajan  presented with complete nasolacrimal duct obstruction with tearing.  After the risks, benefits and alternatives to the proposed procedure were explained, informed consent was obtained.      PROCEDURE:  Cathryn Mahajan  was brought to the operating room and placed supine on the operating table.  General anesthesia was induced.  The medial canthal area and lateral nasal wall were infiltrated with the local anesthetic mixture.  4% cocaine-soaked neurosurgical cottonoids were placed in each nostril.  The face was prepped and draped in the typical sterile ophthalmic fashion.  Attention was directed to the left side.  The caruncle was gently cauterized with the high-temperature cautery.  A Stop Loss trocar needle was used to create a fistula from the area of the caruncle into the nasal cavity, exiting just anterior to the middle turbinate as viewed endoscopically. The small, then large Stop Loss dilators were placed into the fistula.  The dummy Stop Loss tube was placed into the nasal cavity and the proper length of tube determined.  A 15 mm tube was selected.  A 4 x 15 mm Stop Loss Mansfield tube was placed over the trocar, and this was placed down the fistula and viewed endoscopically to be in excellent position.  The patient tolerated the procedures well, was awoken from general anesthesia, and taken to the recovery room in stable condition.         ROMEL MCGRAW MD

## 2020-09-05 ENCOUNTER — TELEPHONE (OUTPATIENT)
Dept: OPHTHALMOLOGY | Facility: CLINIC | Age: 35
End: 2020-09-05

## 2020-09-05 NOTE — TELEPHONE ENCOUNTER
Telephone call to Cathryn Mahajan, spoke with her .    Doing well with no pain, good vision, and no bleeding. All questions were answered, she is doing well, and postoperative care was reviewed.  A postop appointment has been scheduled.    Jarocho Velásquez MD

## 2020-09-08 ENCOUNTER — TELEPHONE (OUTPATIENT)
Dept: OPHTHALMOLOGY | Facility: CLINIC | Age: 35
End: 2020-09-08

## 2020-09-08 DIAGNOSIS — Z98.890 POSTSURGICAL STATE, EYE: Primary | ICD-10-CM

## 2020-09-08 RX ORDER — CARBOXYMETHYLCELLULOSE SODIUM 10 MG/ML
GEL OPHTHALMIC
Qty: 60 ML | Refills: 0 | Status: SHIPPED | OUTPATIENT
Start: 2020-09-08

## 2020-09-08 RX ORDER — MINERAL OIL, PETROLATUM 425; 573 MG/G; MG/G
OINTMENT OPHTHALMIC
Qty: 1 TUBE | Refills: 0 | Status: SHIPPED | OUTPATIENT
Start: 2020-09-08

## 2020-09-08 NOTE — TELEPHONE ENCOUNTER
Pt out of norco since Sunday    Increase pain since Sunday in post-op period    Pt continues with ice packs for pain    Pt using tylenol-- reviewed to stay at 4 grams or less in 24 hour period, less if been directed so by another physician.    Pt will also start alternating with ibuprofen    Pt will continue to keep head elevated    Pt states having dryness and maxitrol drops help    Rx for OTC lubricating gel sent and OTC lubricating eye ointment for at night sent to pt pharmacy    Pt to call tomorrow if pain not adequately controlled    Note to Dr. Velásquez/care coordinator for review/amendment if applicable-- reach out to pt if ok with norco refill    Geoffrey Khoury RN 12:17 PM 09/08/20        [facility] Call Center    Phone Message    May a detailed message be left on voicemail: yes     Reason for Call: Medication Question or concern regarding medication   Prescription Clarification  Name of Medication: HYDROcodone-acetaminophen (NORCO) 5-325 MG tablet   Prescribing Provider: Dr Mcgraw/ Dr Velásquez   Pharmacy: N/A   What on the order needs clarification? Pt has questions about the Pain Management of medication, Please call Pt to discuss  Thank you,          Action Taken: Message routed to:  [facility] ([facility] eye    Travel Screening: Not Applicable

## 2020-09-09 ENCOUNTER — TELEPHONE (OUTPATIENT)
Dept: OPHTHALMOLOGY | Facility: CLINIC | Age: 35
End: 2020-09-09

## 2020-09-09 ENCOUNTER — NURSE TRIAGE (OUTPATIENT)
Dept: NURSING | Facility: CLINIC | Age: 35
End: 2020-09-09

## 2020-09-09 DIAGNOSIS — H04.202 LEFT EPIPHORA: ICD-10-CM

## 2020-09-09 RX ORDER — HYDROCODONE BITARTRATE AND ACETAMINOPHEN 5; 325 MG/1; MG/1
1 TABLET ORAL EVERY 6 HOURS PRN
Qty: 10 TABLET | Refills: 0 | Status: SHIPPED | OUTPATIENT
Start: 2020-09-09

## 2020-09-09 NOTE — TELEPHONE ENCOUNTER
Updated pt [place] sent to patients pharmacy     Reviewed may alternate with Ibuprofen and to stop tylenol by itself (norco has tylenol)     Reviewed may see local eye doctor for any worsening symptoms-- check for any signs of infection/inflammation    Ok to alternate warm packs and cold packs if helps with pain    Pt verbally demonstrated understanding

## 2020-09-09 NOTE — TELEPHONE ENCOUNTER
On 9/4/2020 Left endoscopic conjunctivodacryocystorhinostomy with stop loss clark tube     Pt having constant pressure in her eyes and face.   Pt has been using Tylenol and Advil along with ice packs and heat packs.   Nothing seem to help with the pain in her left eye and face.  The pain seems to going into the right side of her face now.    Pt was up all night and did not get any sleep.     No changes in her vision.   But it hurts to keep her eye open.   Very sensitive to light.   No redness, or drainage coming from the eye.  No fever noted.     Transferred the call to Geoffrey (Nurse) in the eye clinic for further assistance for Pt care.     Rubi Leon RN  Central Triage Red Flags/Med Refills      Additional Information    Negative: Followed an eye injury    Negative: Eye pain from chemical in the eye    Negative: Eye pain from foreign body in eye    Negative: Has sinus pain or pressure    Negative: SEVERE eye pain    Negative: Complete loss of vision in one or both eyes    Negative: Eyelids are very swollen (shut or almost) and fever    Negative: Eyelid (outer) is very red and fever    Negative: Foreign body sensation ('feels like something is in there') and irrigation didn't help    Negative: Vomiting    Negative: Ulcer or sore seen on the cornea (clear center part of the eye)    Negative: Recent eye surgery and increasing eye pain    Negative: Blurred vision and new or worsening    Negative: Patient sounds very sick or weak to the triager    Negative: Eye pain/discomfort and more than mild    Negative: Eyelids are very swollen (shut or almost) and no fever    Negative: Painful rash near eye and multiple small blisters grouped together    Negative: Pain followed bright light exposure from welding    Negative: Looking at light causes SEVERE pain (i.e., photophobia)    Negative: Yellow or green pus occurs    Negative: Eye pain present > 24 hours    Patient wants to be seen    Protocols used: EYE PAIN-A-[place]

## 2020-09-09 NOTE — TELEPHONE ENCOUNTER
Spoke to pt yesterday and patient calling clinic this AM with worsening pain  Pt off norco since Sunday-- using tylenol, ibuprofen and ice packs    Throbbing left eye moving to right eye side    No noted new swelling, redness or warmth to touch (between icing)     Pain woke pt up 3 times last night    Pt lives about 5 hours away ([place], [place])    Pt uses [facility]     Will review plan for pain management with Dr. Velásquez between OR cases this AM    Geoffrey Khoury RN 9:00 AM 09/09/20

## 2020-09-18 ENCOUNTER — VIRTUAL VISIT (OUTPATIENT)
Dept: OPHTHALMOLOGY | Facility: CLINIC | Age: 35
End: 2020-09-18
Payer: COMMERCIAL

## 2020-09-18 DIAGNOSIS — Z98.890 POSTSURGICAL STATE, EYE: Primary | ICD-10-CM

## 2020-09-18 NOTE — PROGRESS NOTES
"Cathryn Mahajan is a 34 year old female who is being evaluated via a billable telephone visit.      The patient has been notified of following:     \"This telephone visit will be conducted via a call between you and your physician/provider. We have found that certain health care needs can be provided without the need for a physical exam.  This service lets us provide the care you need with a short phone conversation.  If a prescription is necessary we can send it directly to your pharmacy.  If lab work is needed we can place an order for that and you can then stop by our lab to have the test done at a later time.    Telephone visits are billed at different rates depending on your insurance coverage. During this emergency period, for some insurers they may be billed the same as an in-person visit.  Please reach out to your insurance provider with any questions.    If during the course of the call the physician/provider feels a telephone visit is not appropriate, you will not be charged for this service.\"    Patient has given verbal consent for Telephone visit?  Yes    What phone number would you like to be contacted at? mobile    How would you like to obtain your AVS? Mail a copy    Phone call duration: 5 minutes    Carlos Mcgraw MD    No chief complaint on file.    Chief Complaint(s) and History of Present Illness(es)    Doing well  No tearing            Assessment & Plan     Cathryn Mahajan is a 34 year old female with the following diagnoses:   1. Postsurgical state, eye     s/p Left cdcr with Mansfield tube  - doing well    PLAN:  F/u as needed  Can see local eye MD as needed         Attending Physician Attestation:  I personally called this patient. Complete documentation of historical and exam elements from today's encounter can be found in the full encounter summary report (not reduplicated in this progress note).  I personally obtained the chief complaint(s) and history of present illness.  I confirmed and " edited as necessary the review of systems, past medical/surgical history, family history, and social history.  I formulated and edited as necessary the assessment and plan and discussed the findings and management plan with the patient and family.     -Carlos Mcgraw MD

## 2022-07-08 NOTE — ANESTHESIA POSTPROCEDURE EVALUATION
Anesthesia POST Procedure Evaluation    Patient: Cathryn Mahajan   MRN:     [id number] Gender:   female   Age:    34 year old :      1985        Preoperative Diagnosis: Left epiphora [H04.202]   Procedure(s):  Left endoscopic conjunctivodacryocystorhinostomy with stop loss clark tube   Postop Comments: No value filed.     Anesthesia Type: General       Disposition: Outpatient   Postop Pain Control: Uneventful            Sign Out: Well controlled pain   PONV: No   Neuro/Psych: Uneventful            Sign Out: Acceptable/Baseline neuro status   Airway/Respiratory: Uneventful            Sign Out: Acceptable/Baseline resp. status   CV/Hemodynamics: Uneventful            Sign Out: Acceptable CV status   Other NRE: NONE   DID A NON-ROUTINE EVENT OCCUR? No         Last Anesthesia Record Vitals:  CRNA VITALS  2020 1209 - 2020 1309      2020             Pulse:  89    SpO2:  96 %    Resp Rate (observed):  8    EKG:  Sinus rhythm          Last PACU Vitals:  Vitals Value Taken Time   /94 2020  1:00 PM   Temp 36  C (96.8  F) 2020  1:00 PM   Pulse     Resp 20 2020  1:00 PM   SpO2 96 % 2020  1:00 PM   Temp src     NIBP     Pulse     SpO2     Resp     Temp     Ht Rate     Temp 2           Electronically Signed By: Judson Myles MD, MD, 2020, 1:29 PM   Abdomen soft, non-tender, no guarding.

## 2022-07-11 ENCOUNTER — TRANSFERRED RECORDS (OUTPATIENT)
Dept: HEALTH INFORMATION MANAGEMENT | Facility: CLINIC | Age: 37
End: 2022-07-11

## (undated) DEVICE — SOL NACL 0.9% INJ 1000ML BAG 2B1324X

## (undated) DEVICE — NDL 25GA 2"  8881200441

## (undated) DEVICE — SPONGE COTTONOID 1/2X3" 80-1407

## (undated) DEVICE — PACK MINOR EYE CUSTOM ASC

## (undated) DEVICE — ESU EYE HIGH TEMP 65410-183

## (undated) DEVICE — EYE PREP BETADINE 5% SOLUTION 30ML 0065-0411-30

## (undated) DEVICE — GLOVE PROTEXIS MICRO 7.5  2D73PM75

## (undated) DEVICE — BLADE KNIFE SURG 15 371115

## (undated) DEVICE — SYR 03ML LL W/O NDL 309657

## (undated) DEVICE — TUBING SUCTION 12"X1/4" N612

## (undated) DEVICE — PEN MARKING SKIN TYCO DEVON DUAL TIP 31145868

## (undated) DEVICE — EYE INTRODUCER SET FOR STOPLOSS JONES TUBES S1.7500

## (undated) DEVICE — LINEN TOWEL PACK X5 5464

## (undated) RX ORDER — LIDOCAINE HYDROCHLORIDE 20 MG/ML
INJECTION, SOLUTION EPIDURAL; INFILTRATION; INTRACAUDAL; PERINEURAL
Status: DISPENSED
Start: 2020-09-04

## (undated) RX ORDER — FENTANYL CITRATE 50 UG/ML
INJECTION, SOLUTION INTRAMUSCULAR; INTRAVENOUS
Status: DISPENSED
Start: 2020-09-04

## (undated) RX ORDER — DEXAMETHASONE SODIUM PHOSPHATE 4 MG/ML
INJECTION, SOLUTION INTRA-ARTICULAR; INTRALESIONAL; INTRAMUSCULAR; INTRAVENOUS; SOFT TISSUE
Status: DISPENSED
Start: 2020-09-04

## (undated) RX ORDER — OXYCODONE HYDROCHLORIDE 5 MG/1
TABLET ORAL
Status: DISPENSED
Start: 2020-09-04

## (undated) RX ORDER — ACETAMINOPHEN 325 MG/1
TABLET ORAL
Status: DISPENSED
Start: 2020-09-04

## (undated) RX ORDER — ONDANSETRON 2 MG/ML
INJECTION INTRAMUSCULAR; INTRAVENOUS
Status: DISPENSED
Start: 2020-09-04

## (undated) RX ORDER — PROPOFOL 10 MG/ML
INJECTION, EMULSION INTRAVENOUS
Status: DISPENSED
Start: 2020-09-04

## (undated) RX ORDER — COCAINE HYDROCHLORIDE 40 MG/ML
SOLUTION NASAL
Status: DISPENSED
Start: 2020-09-04